# Patient Record
Sex: MALE | Race: ASIAN | NOT HISPANIC OR LATINO | Employment: FULL TIME | URBAN - METROPOLITAN AREA
[De-identification: names, ages, dates, MRNs, and addresses within clinical notes are randomized per-mention and may not be internally consistent; named-entity substitution may affect disease eponyms.]

---

## 2018-04-18 ENCOUNTER — OFFICE VISIT (OUTPATIENT)
Dept: URGENT CARE | Facility: CLINIC | Age: 28
End: 2018-04-18

## 2018-04-18 VITALS
DIASTOLIC BLOOD PRESSURE: 64 MMHG | HEART RATE: 66 BPM | OXYGEN SATURATION: 97 % | WEIGHT: 156 LBS | BODY MASS INDEX: 21.13 KG/M2 | SYSTOLIC BLOOD PRESSURE: 112 MMHG | RESPIRATION RATE: 14 BRPM | TEMPERATURE: 97.8 F | HEIGHT: 72 IN

## 2018-04-18 DIAGNOSIS — Z02.1 PHYSICAL EXAM, PRE-EMPLOYMENT: ICD-10-CM

## 2018-04-18 PROCEDURE — 8907 PR URINE COLLECT ONLY: Performed by: NURSE PRACTITIONER

## 2018-04-18 PROCEDURE — 8915 PR COMPREHENSIVE PHYSICAL: Performed by: NURSE PRACTITIONER

## 2018-04-18 ASSESSMENT — ENCOUNTER SYMPTOMS
CHILLS: 0
SPUTUM PRODUCTION: 0
DIAPHORESIS: 0
FALLS: 0
WHEEZING: 0
SHORTNESS OF BREATH: 0
BACK PAIN: 0
WEAKNESS: 0
HEADACHES: 0
FEVER: 0
NECK PAIN: 0
PALPITATIONS: 0
HEMOPTYSIS: 0
ORTHOPNEA: 0
MYALGIAS: 0
COUGH: 0
DIZZINESS: 0
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
## 2021-12-07 ENCOUNTER — TELEPHONE (OUTPATIENT)
Dept: SCHEDULING | Facility: IMAGING CENTER | Age: 31
End: 2021-12-07

## 2021-12-13 ENCOUNTER — TELEPHONE (OUTPATIENT)
Dept: SCHEDULING | Facility: IMAGING CENTER | Age: 31
End: 2021-12-13

## 2022-01-05 ENCOUNTER — OFFICE VISIT (OUTPATIENT)
Dept: MEDICAL GROUP | Facility: PHYSICIAN GROUP | Age: 32
End: 2022-01-05

## 2022-01-05 VITALS
SYSTOLIC BLOOD PRESSURE: 110 MMHG | RESPIRATION RATE: 14 BRPM | HEIGHT: 67 IN | WEIGHT: 158 LBS | DIASTOLIC BLOOD PRESSURE: 78 MMHG | HEART RATE: 90 BPM | OXYGEN SATURATION: 96 % | BODY MASS INDEX: 24.8 KG/M2 | TEMPERATURE: 99 F

## 2022-01-05 DIAGNOSIS — Z13.6 ENCOUNTER FOR LIPID SCREENING FOR CARDIOVASCULAR DISEASE: ICD-10-CM

## 2022-01-05 DIAGNOSIS — R10.13 EPIGASTRIC PAIN: ICD-10-CM

## 2022-01-05 DIAGNOSIS — Z13.220 ENCOUNTER FOR LIPID SCREENING FOR CARDIOVASCULAR DISEASE: ICD-10-CM

## 2022-01-05 DIAGNOSIS — K92.1 BLOODY STOOL: ICD-10-CM

## 2022-01-05 PROCEDURE — 99204 OFFICE O/P NEW MOD 45 MIN: CPT | Performed by: STUDENT IN AN ORGANIZED HEALTH CARE EDUCATION/TRAINING PROGRAM

## 2022-01-05 RX ORDER — FAMOTIDINE 20 MG/1
TABLET, FILM COATED ORAL
COMMUNITY
End: 2022-01-05

## 2022-01-05 RX ORDER — ONDANSETRON 4 MG/1
TABLET, FILM COATED ORAL
COMMUNITY
End: 2022-01-05

## 2022-01-05 ASSESSMENT — PATIENT HEALTH QUESTIONNAIRE - PHQ9
CLINICAL INTERPRETATION OF PHQ2 SCORE: 4
5. POOR APPETITE OR OVEREATING: 1 - SEVERAL DAYS
SUM OF ALL RESPONSES TO PHQ QUESTIONS 1-9: 11

## 2022-01-05 NOTE — PROGRESS NOTES
"New to Provider  (and [facility])      Tyree Kumar is a 31 y.o. male.    Reason for visit: New patient to establish          But self-pay, so may establish elsewhere.      - Prior PCP:  None.       (no routine care).   Chief Complaint   Patient presents with   • Establish Care   • Abdominal Pain             Problems discussed this visit:    This note uses problem-based documentation.  Subjective HPI is included in Assessment & Plan, at bottom of note.   Problem   Epigastric Pain                          Past Medical History:   Diagnosis Date   • Anxiety    • Liver disease     cyst on liver; to f/u with GI       History reviewed. No pertinent surgical history.       Family History   Problem Relation Age of Onset   • Anxiety disorder Mother    • Hypertension Mother    • Cancer Paternal Grandmother         prostate cancer   • Hyperlipidemia Paternal Grandmother        Social History     Tobacco Use   • Smoking status: Current Every Day Smoker     Packs/day: 0.25     Types: Cigarettes   • Smokeless tobacco: Never Used   • Tobacco comment: Smoker  (< 1/2 ppd x 10+ years)   Substance Use Topics   • Alcohol use: Not Currently     Comment: very rare       Current medications:  (including new changes):  No current outpatient medications on file.     No current facility-administered medications for this visit.       Allergies as of 01/05/2022 - Reviewed 01/05/2022   Allergen Reaction Noted   • Other food  11/15/2014                    Review of Symptoms  (as noted elsewhere, and .....)    GEN/CONST:   Denies fever, chills,    CARDIO:   Denies chest pain, or edema.    RESP:   Denies shortness of breath, or coughing.  GI:    + See HPI....      PSYCH:  Denies anxiety, depression,         Physical Exam  /78   Pulse 90   Temp 37.2 °C (99 °F) (Temporal)   Resp 14   Ht 1.702 m (5' 7\")   Wt 71.7 kg (158 lb)   SpO2 96%   BMI 24.75 kg/m²   General:  Alert and oriented, No apparent distress.  Neck: Supple. No " lymphadenopathy noted. Thyroid not enlarged.   Lungs: Clear to auscultation bilaterally.  No wheezes or rales.     Cardiovascular: Regular rate and rhythm.  No murmurs, or gallops.  Abdomen:  Soft.  Non-distended.       - Non-tender.        - Negative Ga's sign.   Extremities: No leg edema.  Good general motion of extremities.    Psychological: Appears to have normal mood and affect.      Labs:  No recent labs to review....    - new labs ordered.                             Assessment & Plan  (with subjective history and orders):  Of note, the list below is not in a specific nor sortable order.      Problem List Items Addressed This Visit     Epigastric pain     Chronic but intermittent episodes of abdominal pain.  Often in the middle of his abdomen, or epigastric region.  He notes this occasionally limits how much he eats, and has rarely vomited, from this.  He notes it is unpredictable in terms of duration.… Last year, it lasted for months at the time, without going away.  Recently, it has only occurred for a couple days at a time.  However, it is very unpredictable, and he has difficulty estimating how often this issue reoccurs.  He denies any current/routine use of alcohol or marijuana.  Not clearly timed with foods.…  He does note previously noting some blood on his stool, approximately 1 year ago (but not recently).    Concern for possible colitis or ulcer (versus IBS or other issue, given his history of anxiety).  -We will get basic labs, including H. pylori test.  -Also get fecal occult blood test, given his history.    The process of evaluation was discussed with the patient, and since he is currently unemployed and a self-pay patient, will try to check with getting discounted lab work (for self-pay patients).… We also discussed possibility of checking with [facility] or other providers in the area, who have sliding scales or reduced fees.…  He is unclear if he will follow-up here or  there at this time.  Patient was provided with lab work, in case he chooses to follow-up here.  However, it is understandable if he wishes to go somewhere that is much more affordable.         Relevant Orders    CBC WITH DIFFERENTIAL    Comp Metabolic Panel    H. PYLORI, UREA BREATH TEST, ADULT      Other Visit Diagnoses     Bloody stool        Relevant Orders    CBC WITH DIFFERENTIAL    OCCULT BLOOD FECES IMMUNOASSAY    Encounter for lipid screening for cardiovascular disease        Relevant Orders    Lipid Profile        Of note, the above list is not in a specific nor sortable order.                  Diagnosis Table, with orders:  1. Epigastric pain  CBC WITH DIFFERENTIAL    Comp Metabolic Panel    H. PYLORI, UREA BREATH TEST, ADULT   2. Bloody stool  CBC WITH DIFFERENTIAL    OCCULT BLOOD FECES IMMUNOASSAY   3. Encounter for lipid screening for cardiovascular disease  Lipid Profile                 Follow-up:  about 1 week after labs are done   (or with other clinic, if trying [facility]).                 A computerized dictation system may have been used in this note.    Despite review, there may be some errors in spelling or grammar.    Ciro Sevilla M.D.  1/5/2022

## 2022-01-05 NOTE — PATIENT INSTRUCTIONS
"Please have the lab work done, at some point, to check for basic underlying conditions, and to see if there is any evidence of an ulcer or blood in the stool.    Please be aware that insurance, the labs here may cost quite a bit.  Please check online to see if there is a discounted time or location through [facility], for cash patients, or you may wish to follow-up with one of the local [facility]\" (or [facility] providers, to see if the care there is more affordable (or possibly free).    Otherwise, you are welcome to follow-up here.  Thank you.  "

## 2022-01-05 NOTE — ASSESSMENT & PLAN NOTE
Chronic but intermittent episodes of abdominal pain.  Often in the middle of his abdomen, or epigastric region.  He notes this occasionally limits how much he eats, and has rarely vomited, from this.  He notes it is unpredictable in terms of duration.… Last year, it lasted for months at the time, without going away.  Recently, it has only occurred for a couple days at a time.  However, it is very unpredictable, and he has difficulty estimating how often this issue reoccurs.  He denies any current/routine use of alcohol or marijuana.  Not clearly timed with foods.…  He does note previously noting some blood on his stool, approximately 1 year ago (but not recently).    Concern for possible colitis or ulcer (versus IBS or other issue, given his history of anxiety).  -We will get basic labs, including H. pylori test.  -Also get fecal occult blood test, given his history.    The process of evaluation was discussed with the patient, and since he is currently unemployed and a self-pay patient, will try to check with getting discounted lab work (for self-pay patients).… We also discussed possibility of checking with [facility] or other providers in the area, who have sliding scales or reduced fees.…  He is unclear if he will follow-up here or there at this time.  Patient was provided with lab work, in case he chooses to follow-up here.  However, it is understandable if he wishes to go somewhere that is much more affordable.

## 2022-04-17 ENCOUNTER — OFFICE VISIT (OUTPATIENT)
Dept: URGENT CARE | Facility: PHYSICIAN GROUP | Age: 32
End: 2022-04-17
Payer: MEDICAID

## 2022-04-17 VITALS
RESPIRATION RATE: 20 BRPM | WEIGHT: 157 LBS | HEART RATE: 97 BPM | BODY MASS INDEX: 23.79 KG/M2 | TEMPERATURE: 98.8 F | OXYGEN SATURATION: 100 % | SYSTOLIC BLOOD PRESSURE: 128 MMHG | DIASTOLIC BLOOD PRESSURE: 68 MMHG | HEIGHT: 68 IN

## 2022-04-17 DIAGNOSIS — R42 LIGHTHEADEDNESS: ICD-10-CM

## 2022-04-17 DIAGNOSIS — R42 DIZZINESS: ICD-10-CM

## 2022-04-17 LAB
APPEARANCE UR: NORMAL
BILIRUB UR STRIP-MCNC: NORMAL MG/DL
COLOR UR AUTO: NORMAL
GLUCOSE BLD-MCNC: 96 MG/DL (ref 70–100)
GLUCOSE UR STRIP.AUTO-MCNC: NORMAL MG/DL
KETONES UR STRIP.AUTO-MCNC: NORMAL MG/DL
LEUKOCYTE ESTERASE UR QL STRIP.AUTO: NORMAL
NITRITE UR QL STRIP.AUTO: NORMAL
PH UR STRIP.AUTO: 7 [PH] (ref 5–8)
PROT UR QL STRIP: NORMAL MG/DL
RBC UR QL AUTO: NORMAL
SP GR UR STRIP.AUTO: 1.02
UROBILINOGEN UR STRIP-MCNC: 0.2 MG/DL

## 2022-04-17 PROCEDURE — 82962 GLUCOSE BLOOD TEST: CPT | Performed by: FAMILY MEDICINE

## 2022-04-17 PROCEDURE — 81002 URINALYSIS NONAUTO W/O SCOPE: CPT | Performed by: FAMILY MEDICINE

## 2022-04-17 PROCEDURE — 99214 OFFICE O/P EST MOD 30 MIN: CPT | Performed by: FAMILY MEDICINE

## 2022-04-17 NOTE — PROGRESS NOTES
Chief Complaint:    Chief Complaint   Patient presents with   • Dizziness     Feeling light headed. X4-5 days blurred vision, when smoking cigarettes. Pt is eating and drinking normally.         History of Present Illness:    Fluctuating feeling of lightheadedness, dizziness, and trouble focusing x 4-5 days. No other symptoms to point towards a source. No fever, nasal symptoms, sore throat, cough, nausea, vomiting, diarrhea, urine symptoms, or rash. Initially thought could be related to smoking, but there was a day where he had the symptoms without smoking.      Past Medical History:    Past Medical History:   Diagnosis Date   • Anxiety    • Liver disease     cyst on liver; to f/u with GI     Past Surgical History:    History reviewed. No pertinent surgical history.    Social History:    Social History     Socioeconomic History   • Marital status: Single     Spouse name: Not on file   • Number of children: Not on file   • Years of education: Not on file   • Highest education level: Not on file   Occupational History   • Not on file   Tobacco Use   • Smoking status: Current Every Day Smoker     Packs/day: 0.25     Types: Cigarettes   • Smokeless tobacco: Never Used   • Tobacco comment: Smoker  (< 1/2 ppd x 10+ years)   Vaping Use   • Vaping Use: Never used   Substance and Sexual Activity   • Alcohol use: Not Currently     Comment: very rare   • Drug use: Not Currently   • Sexual activity: Yes     Partners: Female     Comment: Single (long-term partner)   Other Topics Concern   • Not on file   Social History Narrative   • Not on file     Social Determinants of Health     Financial Resource Strain: Not on file   Food Insecurity: Not on file   Transportation Needs: Not on file   Physical Activity: Not on file   Stress: Not on file   Social Connections: Not on file   Intimate Partner Violence: Not on file   Housing Stability: Not on file     Family History:    Family History   Problem Relation Age of Onset   • Anxiety  "disorder Mother    • Hypertension Mother    • Cancer Paternal Grandmother         prostate cancer   • Hyperlipidemia Paternal Grandmother      Medications:    No current outpatient medications on file prior to visit.     No current facility-administered medications on file prior to visit.     Allergies:    Allergies   Allergen Reactions   • Other Food      Melons, avocado (if in large amount)       Vitals:    Vitals:    04/17/22 1436   BP: 128/68   Pulse: 97   Resp: 20   Temp: 37.1 °C (98.8 °F)   TempSrc: Temporal   SpO2: 100%   Weight: 71.2 kg (157 lb)   Height: 1.727 m (5' 8\")       Physical Exam:    Constitutional: Vital signs reviewed. Appears well-developed and well-nourished. No acute distress.   Eyes: Sclera white, conjunctivae clear. PERRLA.  ENT: External ears normal. External auditory canals normal without discharge. TMs translucent and non-bulging. Hearing normal. Lips/teeth are normal. Oral mucosa pink and moist. Posterior pharynx: WNL.  Neck: Neck supple.   Cardiovascular: Regular rate and rhythm. No murmur.  Pulmonary/Chest: Respirations non-labored. Clear to auscultation bilaterally.  Abdomen: Bowel sounds are normal active. Soft, non-distended, and non-tender to palpation.  Musculoskeletal: Normal gait. Normal range of motion. No muscular atrophy or weakness.  Neurological: Alert and oriented to person, place, and time. CN 2-12 intact. Muscle tone normal. Coordination normal. Normal cerebellar exam. Negative Jeremy-Hallpike maneuver bilaterally.  Skin: No rashes or lesions. Warm, dry, normal turgor.  Psychiatric: Normal mood and affect. Behavior is normal. Judgment and thought content normal.       Diagnostics:    POCT Urinalysis  Order: [id number]   Status: Final result     Visible to patient: Catia (scheduled for 4/18/2022  1:05 PM)     Next appt: None     Dx: Dizziness; Lightheadedness     0 Result Notes     Ref Range & Units  2:54 PM   POC Color Negative CYNTHIA    POC Appearance Negative CLOUDY    POC " Leukocyte Esterase Negative NEG    POC Nitrites Negative NEG    POC Urobiligen Negative (0.2) mg/dL 0.2    POC Protein Negative mg/dL NEG    POC Urine PH 5.0 - 8.0 7.0    POC Blood Negative NEG    POC Specific Gravity <1.005 - >1.030 1.020    POC Ketones Negative mg/dL NEG    POC Bilirubin Negative mg/dL NEG    POC Glucose Negative mg/dL NEG    Resulting Agency  [facility]              Specimen Collected: 22  2:54 PM Last Resulted: 22  2:54 PM              POCT glucose  Order: [id number]   Status: Final result     Visible to patient: No (scheduled for 2022  1:05 PM)     Next appt: None     Dx: Dizziness; Lightheadedness     0 Result Notes     Ref Range & Units  3:05 PM   Glucose - Accu-Ck 70 - 100 mg/dL 96    Resulting Agency  [facility]              Specimen Collected: 22  3:05 PM Last Resulted: 22  3:05 PM             Medical Decision Makin. Lightheadedness  - POCT Urinalysis  - POCT glucose    2. Dizziness  - POCT Urinalysis  - POCT glucose      Discussed with him DDX, management options, and risks, benefits, and alternatives to treatment plan agreed upon.    Fluctuating feeling of lightheadedness, dizziness, and trouble focusing x 4-5 days. No other symptoms to point towards a source. No fever, nasal symptoms, sore throat, cough, nausea, vomiting, diarrhea, urine symptoms, or rash. Initially thought could be related to smoking, but there was a day where he had the symptoms without smoking.    Urine dipstick is normal.    Fingerstick blood sugar: 96 mg/dl (ate about 5 hrs ago).    ? etiology of symptoms. Vitals are stable. Exam is benign and non-focal.     Offered to check EKG. He declines (self-pay).    We only have x-ray imaging here which will not be helpful for evaluation of his symptoms.    We only have POC labs here today. Advised other general screening labs could be done tomorrow when lab is here to rule out conditions, understanding nothing may show up on labs to  explain symptoms.    He would like to further observe at this time and seek medical evaluation should anything worsen or any other concerns.

## 2022-08-07 ENCOUNTER — OFFICE VISIT (OUTPATIENT)
Dept: URGENT CARE | Facility: CLINIC | Age: 32
End: 2022-08-07
Payer: MEDICAID

## 2022-08-07 VITALS
DIASTOLIC BLOOD PRESSURE: 84 MMHG | TEMPERATURE: 99.6 F | OXYGEN SATURATION: 98 % | RESPIRATION RATE: 16 BRPM | HEART RATE: 86 BPM | SYSTOLIC BLOOD PRESSURE: 120 MMHG | HEIGHT: 67 IN | BODY MASS INDEX: 24.64 KG/M2 | WEIGHT: 157 LBS

## 2022-08-07 DIAGNOSIS — K92.1 BLOODY STOOLS: ICD-10-CM

## 2022-08-07 PROCEDURE — 99214 OFFICE O/P EST MOD 30 MIN: CPT | Performed by: NURSE PRACTITIONER

## 2022-08-07 ASSESSMENT — ENCOUNTER SYMPTOMS
CHILLS: 0
BLOOD IN STOOL: 1
MYALGIAS: 0
DIARRHEA: 0
ABDOMINAL PAIN: 1
FEVER: 0
CONSTIPATION: 0
VOMITING: 0

## 2022-08-08 NOTE — PROGRESS NOTES
Subjective     Tyree Kumar is a 31 y.o. male who presents with Bloody Stools (X1 week, stomach pain, slight mucus like in the beginning/)            HPI New. 31 year old male with stomach pain and blood in stool for one week. He reports stool with bright red blood over stool and when he wipes. Denies constipation or diarrhea. He denies fever, chills, dizziness or headache. He states when he has pain it is in upper abdomen and stabbing. This is intermittent. Has been seen by PCP for this in Jan but never followed through on labs. Hx of colon cancer in grandfather. He has not taken any medication for this issue.  Other food  No current outpatient medications on file prior to visit.     No current facility-administered medications on file prior to visit.     Social History     Socioeconomic History   • Marital status: Single     Spouse name: Not on file   • Number of children: Not on file   • Years of education: Not on file   • Highest education level: Not on file   Occupational History   • Not on file   Tobacco Use   • Smoking status: Current Some Day Smoker     Packs/day: 0.25     Types: Cigarettes   • Smokeless tobacco: Never Used   • Tobacco comment: Smoker  (< 1/2 ppd x 10+ years)   Vaping Use   • Vaping Use: Never used   Substance and Sexual Activity   • Alcohol use: Not Currently     Comment: very rare   • Drug use: Not Currently   • Sexual activity: Yes     Partners: Female     Comment: Single (long-term partner)   Other Topics Concern   • Not on file   Social History Narrative   • Not on file     Social Determinants of Health     Financial Resource Strain: Not on file   Food Insecurity: Not on file   Transportation Needs: Not on file   Physical Activity: Not on file   Stress: Not on file   Social Connections: Not on file   Intimate Partner Violence: Not on file   Housing Stability: Not on file     Breast Cancer-related family history is not on file.      Review of Systems   Constitutional: Negative for chills  "and fever.   Gastrointestinal: Positive for abdominal pain and blood in stool. Negative for constipation, diarrhea and vomiting.   Musculoskeletal: Negative for myalgias.              Objective     /84   Pulse 86   Temp 37.6 °C (99.6 °F) (Temporal)   Resp 16   Ht 1.702 m (5' 7\")   Wt 71.2 kg (157 lb)   SpO2 98%   BMI 24.59 kg/m²      Physical Exam  Nursing note reviewed.   Cardiovascular:      Rate and Rhythm: Normal rate and regular rhythm.      Heart sounds: No murmur heard.  Pulmonary:      Effort: Pulmonary effort is normal.      Breath sounds: Normal breath sounds.   Abdominal:      General: Abdomen is flat.      Palpations: Abdomen is soft. There is no mass.      Tenderness: There is no abdominal tenderness.   Skin:     General: Skin is warm and dry.   Neurological:      General: No focal deficit present.      Mental Status: He is alert and oriented to person, place, and time.   Psychiatric:         Mood and Affect: Mood normal.                             Assessment & Plan        1. Bloody stools  Referral to Gastroenterology    CBC WITH DIFFERENTIAL    OCCULT BLOOD FECES IMMUNOASSAY     Referral to GI as he has family hx of colon cancer.  Labs- will call with results.  VSS on this visit. ED precautions given.              "

## 2023-04-24 ENCOUNTER — OFFICE VISIT (OUTPATIENT)
Dept: URGENT CARE | Facility: PHYSICIAN GROUP | Age: 33
End: 2023-04-24
Payer: MEDICAID

## 2023-04-24 ENCOUNTER — APPOINTMENT (OUTPATIENT)
Dept: RADIOLOGY | Facility: IMAGING CENTER | Age: 33
End: 2023-04-24
Attending: PHYSICIAN ASSISTANT
Payer: MEDICAID

## 2023-04-24 VITALS
HEART RATE: 60 BPM | TEMPERATURE: 97.1 F | DIASTOLIC BLOOD PRESSURE: 62 MMHG | SYSTOLIC BLOOD PRESSURE: 120 MMHG | RESPIRATION RATE: 14 BRPM | BODY MASS INDEX: 26.37 KG/M2 | OXYGEN SATURATION: 100 % | HEIGHT: 67 IN | WEIGHT: 168 LBS

## 2023-04-24 DIAGNOSIS — R06.02 SHORTNESS OF BREATH: ICD-10-CM

## 2023-04-24 PROCEDURE — 71046 X-RAY EXAM CHEST 2 VIEWS: CPT | Mod: TC,FY | Performed by: PHYSICIAN ASSISTANT

## 2023-04-24 PROCEDURE — 99213 OFFICE O/P EST LOW 20 MIN: CPT | Performed by: PHYSICIAN ASSISTANT

## 2023-04-24 ASSESSMENT — ENCOUNTER SYMPTOMS
HEADACHES: 0
WHEEZING: 0
NAUSEA: 0
FEVER: 0
EYE DISCHARGE: 0
EYE REDNESS: 0
DIZZINESS: 1
DIARRHEA: 0
MYALGIAS: 0
VOMITING: 0
COUGH: 0
SHORTNESS OF BREATH: 1

## 2023-04-24 NOTE — PROGRESS NOTES
Boubacar Kumar is a 32 y.o. male who presents with Dizziness (X 1 day ) and Shortness of Breath (X 3 days /Dry metallic taste in mouth. )            This is a new problem.  The patient presents to clinic complaining of intermittent shortness of breath x3 days.  The patient states his shortness of breath is most noticeable upon exertion.    Shortness of Breath  This is a new problem. Episode onset: x 3 days ago. The problem occurs intermittently. The problem has been unchanged. Pertinent negatives include no chest pain, fever, headaches, leg pain, leg swelling, sputum production, vomiting or wheezing. The symptoms are aggravated by exercise (and exertion. The patient states he was helping a friend change the tires on his car earlier today and became winded after changing 2 of the tires.). The patient has no known risk factors for DVT/PE. He has tried nothing for the symptoms.     The patient states earlier today he developed some lightheadedness, which is since improved/resolved.  The patient states the lightheadedness occurred upon arriving to clinic and was not associated with any exertion.    The patient reports no recent sick contacts.  He reports no known exposure to COVID-19 and/or influenza.    The patient is a former smoker.    The patient reports no prior history of PE/DVT.    The patient admits to increased stress, as well as anxiety related to his current symptoms.  The patient states his father recently passed away.  The patient states his father had a history of lung cancer.  The patient states he also recently lost his sister.    PMH:  has a past medical history of Anxiety and Liver disease.  MEDS: No current outpatient medications on file.  ALLERGIES:   Allergies   Allergen Reactions    Other Food      Melons, avocado (if in large amount)     SURGHX: No past surgical history on file.  SOCHX:  reports that he has been smoking cigarettes. He has been smoking an average of .25 packs per day.  "He has never used smokeless tobacco. He reports that he does not currently use alcohol. He reports that he does not currently use drugs.  FH: Family history was reviewed, no pertinent findings to report    Review of Systems   Constitutional:  Negative for fever.   HENT:  Positive for congestion (The patient reports ongoing congestion, which she attributes to seasonal allergies.).    Eyes:  Negative for discharge and redness.   Respiratory:  Positive for shortness of breath. Negative for cough, sputum production and wheezing.    Cardiovascular:  Negative for chest pain and leg swelling.   Gastrointestinal:  Negative for diarrhea, nausea and vomiting.   Musculoskeletal:  Negative for myalgias.   Neurological:  Positive for dizziness. Negative for headaches.            Objective     /62   Pulse 60   Temp 36.2 °C (97.1 °F) (Temporal)   Resp 14   Ht 1.702 m (5' 7\")   Wt 76.2 kg (168 lb)   SpO2 100%   BMI 26.31 kg/m²      Physical Exam  Constitutional:       General: He is not in acute distress.     Appearance: Normal appearance. He is not ill-appearing.   HENT:      Head: Normocephalic and atraumatic.      Right Ear: External ear normal.      Left Ear: External ear normal.      Nose: Nose normal.      Mouth/Throat:      Mouth: Mucous membranes are moist.      Pharynx: Oropharynx is clear. No posterior oropharyngeal erythema.   Eyes:      Extraocular Movements: Extraocular movements intact.      Conjunctiva/sclera: Conjunctivae normal.   Cardiovascular:      Rate and Rhythm: Normal rate and regular rhythm.      Heart sounds: Normal heart sounds.   Pulmonary:      Effort: Pulmonary effort is normal. No respiratory distress.      Breath sounds: Normal breath sounds. No wheezing.   Musculoskeletal:         General: Normal range of motion.      Cervical back: Normal range of motion and neck supple.      Right lower leg: No edema.      Left lower leg: No edema.   Skin:     General: Skin is warm and dry. "   Neurological:      Mental Status: He is alert and oriented to person, place, and time.             Progress:  CXR:  COMPARISON:  None available.     FINDINGS:  The lungs are clear. No consolidation, edema, effusion, or pneumothorax is noted. Cardiac silhouette is within normal limits in size and unremarkable. Osseous structures and soft tissues are unremarkable.     IMPRESSION:  No acute cardiopulmonary process.             Assessment & Plan         1. Shortness of breath  - DX-CHEST-2 VIEWS; Future  - Referral to establish with [facility] PCP    The patient's presenting symptoms and physical exam findings are consistent with shortness of breath.  The patient has been experiencing intermittent shortness of breath with exertion over the past 3 days.  The patient's physical exam today in clinic was normal.  The patient's lungs were clear to auscultation without wheezing or rhonchi, and his pulse ox was within normal limits.  The patient is nontoxic and appears in no acute distress.  The patient's vital signs are stable and within normal limits.  He is afebrile today in clinic.  A chest x-ray was obtained to further evaluate the patient's current symptoms.  The patient's chest x-ray showed no acute cardiopulmonary process.  The cause of the patient's intermittent shortness of breath is unknown at this time.  The patient has no risk factors for an acute PE/DVT.  The patient's PERC score is 0.  The patient admits a history of anxiety with prior panic attacks.  The patient's current shortness of breath could be related to increased stress and anxiety due to the loss of his family members over the recent months.  The patient's shortness of breath could also be related to his known seasonal allergies.  Advised the patient to monitor for worsening signs and or symptoms.  Recommend OTC medications and supportive care for symptomatic management.  We will place a referral to primary care for further evaluation and management.   Discussed strict ED precautions with the patient, and he verbalized understanding.       Differential diagnoses, supportive care, and indications for immediate follow-up discussed with patient.   Instructed to return to clinic or nearest emergency department for any change in condition, further concerns, or worsening of symptoms.    OTC Tylenol or Motrin for fever/discomfort.  OTC antihistamines for symptomatic relief  Monitor for worsening signs or symptoms  Referral to Primary Care  Return to clinic or go to the ED if symptoms worsen or fail to improve, or if patient develop worsening/increasing/persistent shortness of breath, wheezing, chest pain, leg swelling, hemoptysis, cough, congestion, ear pain, sore throat, dizziness, lightheadedness, fever/chills, and/or any concerning symptoms.       Discussed plan with the patient, and he agrees to the above.    I personally reviewed prior external notes and test results pertinent to today's visit.  I have independently reviewed and interpreted all diagnostics ordered during this urgent care visit.     Please note that this dictation was created using voice recognition software. I have made every reasonable attempt to correct obvious errors, but I expect that there may be errors of grammar and possibly content that I did not discover before finalizing the note.     This note was electronically signed by Angelita Mace PA-C

## 2023-04-25 ASSESSMENT — ENCOUNTER SYMPTOMS
SPUTUM PRODUCTION: 0
LEG PAIN: 0

## 2023-05-03 ENCOUNTER — TELEPHONE (OUTPATIENT)
Dept: HEALTH INFORMATION MANAGEMENT | Facility: OTHER | Age: 33
End: 2023-05-03
Payer: MEDICAID

## 2024-03-01 ENCOUNTER — APPOINTMENT (OUTPATIENT)
Dept: RADIOLOGY | Facility: MEDICAL CENTER | Age: 34
End: 2024-03-01
Attending: EMERGENCY MEDICINE
Payer: COMMERCIAL

## 2024-03-01 ENCOUNTER — HOSPITAL ENCOUNTER (EMERGENCY)
Facility: MEDICAL CENTER | Age: 34
End: 2024-03-01
Attending: EMERGENCY MEDICINE
Payer: COMMERCIAL

## 2024-03-01 VITALS
DIASTOLIC BLOOD PRESSURE: 51 MMHG | HEART RATE: 78 BPM | HEIGHT: 67 IN | WEIGHT: 175.49 LBS | OXYGEN SATURATION: 94 % | BODY MASS INDEX: 27.54 KG/M2 | SYSTOLIC BLOOD PRESSURE: 110 MMHG | TEMPERATURE: 97 F | RESPIRATION RATE: 16 BRPM

## 2024-03-01 DIAGNOSIS — K76.9 LIVER LESION: ICD-10-CM

## 2024-03-01 DIAGNOSIS — R10.9 ACUTE ABDOMINAL PAIN: ICD-10-CM

## 2024-03-01 DIAGNOSIS — R07.9 ACUTE CHEST PAIN: ICD-10-CM

## 2024-03-01 LAB
ALBUMIN SERPL BCP-MCNC: 4.5 G/DL (ref 3.2–4.9)
ALBUMIN/GLOB SERPL: 1.5 G/DL
ALP SERPL-CCNC: 111 U/L (ref 30–99)
ALT SERPL-CCNC: 22 U/L (ref 2–50)
ANION GAP SERPL CALC-SCNC: 13 MMOL/L (ref 7–16)
APPEARANCE UR: CLEAR
AST SERPL-CCNC: 24 U/L (ref 12–45)
BASOPHILS # BLD AUTO: 0.3 % (ref 0–1.8)
BASOPHILS # BLD: 0.03 K/UL (ref 0–0.12)
BILIRUB SERPL-MCNC: 0.3 MG/DL (ref 0.1–1.5)
BILIRUB UR QL STRIP.AUTO: NEGATIVE
BUN SERPL-MCNC: 13 MG/DL (ref 8–22)
CALCIUM ALBUM COR SERPL-MCNC: 8.9 MG/DL (ref 8.5–10.5)
CALCIUM SERPL-MCNC: 9.3 MG/DL (ref 8.5–10.5)
CHLORIDE SERPL-SCNC: 101 MMOL/L (ref 96–112)
CO2 SERPL-SCNC: 24 MMOL/L (ref 20–33)
COLOR UR: YELLOW
CREAT SERPL-MCNC: 0.91 MG/DL (ref 0.5–1.4)
EKG IMPRESSION: NORMAL
EOSINOPHIL # BLD AUTO: 0.22 K/UL (ref 0–0.51)
EOSINOPHIL NFR BLD: 2.2 % (ref 0–6.9)
ERYTHROCYTE [DISTWIDTH] IN BLOOD BY AUTOMATED COUNT: 36.2 FL (ref 35.9–50)
GFR SERPLBLD CREATININE-BSD FMLA CKD-EPI: 114 ML/MIN/1.73 M 2
GLOBULIN SER CALC-MCNC: 3.1 G/DL (ref 1.9–3.5)
GLUCOSE SERPL-MCNC: 88 MG/DL (ref 65–99)
GLUCOSE UR STRIP.AUTO-MCNC: NEGATIVE MG/DL
HCT VFR BLD AUTO: 42.7 % (ref 42–52)
HGB BLD-MCNC: 14.2 G/DL (ref 14–18)
IMM GRANULOCYTES # BLD AUTO: 0.04 K/UL (ref 0–0.11)
IMM GRANULOCYTES NFR BLD AUTO: 0.4 % (ref 0–0.9)
KETONES UR STRIP.AUTO-MCNC: NEGATIVE MG/DL
LEUKOCYTE ESTERASE UR QL STRIP.AUTO: NEGATIVE
LIPASE SERPL-CCNC: 30 U/L (ref 11–82)
LYMPHOCYTES # BLD AUTO: 1.82 K/UL (ref 1–4.8)
LYMPHOCYTES NFR BLD: 18.2 % (ref 22–41)
MCH RBC QN AUTO: 24.6 PG (ref 27–33)
MCHC RBC AUTO-ENTMCNC: 33.3 G/DL (ref 32.3–36.5)
MCV RBC AUTO: 73.9 FL (ref 81.4–97.8)
MICRO URNS: NORMAL
MONOCYTES # BLD AUTO: 0.64 K/UL (ref 0–0.85)
MONOCYTES NFR BLD AUTO: 6.4 % (ref 0–13.4)
NEUTROPHILS # BLD AUTO: 7.26 K/UL (ref 1.82–7.42)
NEUTROPHILS NFR BLD: 72.5 % (ref 44–72)
NITRITE UR QL STRIP.AUTO: NEGATIVE
NRBC # BLD AUTO: 0 K/UL
NRBC BLD-RTO: 0 /100 WBC (ref 0–0.2)
PH UR STRIP.AUTO: 7 [PH] (ref 5–8)
PLATELET # BLD AUTO: 361 K/UL (ref 164–446)
PMV BLD AUTO: 9.1 FL (ref 9–12.9)
POTASSIUM SERPL-SCNC: 3.7 MMOL/L (ref 3.6–5.5)
PROT SERPL-MCNC: 7.6 G/DL (ref 6–8.2)
PROT UR QL STRIP: NEGATIVE MG/DL
RBC # BLD AUTO: 5.78 M/UL (ref 4.7–6.1)
RBC UR QL AUTO: NEGATIVE
SODIUM SERPL-SCNC: 138 MMOL/L (ref 135–145)
SP GR UR STRIP.AUTO: 1.01
TROPONIN T SERPL-MCNC: <6 NG/L (ref 6–19)
UROBILINOGEN UR STRIP.AUTO-MCNC: 0.2 MG/DL
WBC # BLD AUTO: 10 K/UL (ref 4.8–10.8)

## 2024-03-01 PROCEDURE — 74177 CT ABD & PELVIS W/CONTRAST: CPT

## 2024-03-01 PROCEDURE — 93005 ELECTROCARDIOGRAM TRACING: CPT

## 2024-03-01 PROCEDURE — 84484 ASSAY OF TROPONIN QUANT: CPT

## 2024-03-01 PROCEDURE — 93005 ELECTROCARDIOGRAM TRACING: CPT | Performed by: EMERGENCY MEDICINE

## 2024-03-01 PROCEDURE — 71045 X-RAY EXAM CHEST 1 VIEW: CPT

## 2024-03-01 PROCEDURE — 85025 COMPLETE CBC W/AUTO DIFF WBC: CPT

## 2024-03-01 PROCEDURE — 700117 HCHG RX CONTRAST REV CODE 255: Mod: UD | Performed by: EMERGENCY MEDICINE

## 2024-03-01 PROCEDURE — 36415 COLL VENOUS BLD VENIPUNCTURE: CPT

## 2024-03-01 PROCEDURE — 83690 ASSAY OF LIPASE: CPT

## 2024-03-01 PROCEDURE — 99284 EMERGENCY DEPT VISIT MOD MDM: CPT

## 2024-03-01 PROCEDURE — 81003 URINALYSIS AUTO W/O SCOPE: CPT

## 2024-03-01 PROCEDURE — 80053 COMPREHEN METABOLIC PANEL: CPT

## 2024-03-01 RX ADMIN — IOHEXOL 100 ML: 350 INJECTION, SOLUTION INTRAVENOUS at 18:15

## 2024-03-01 ASSESSMENT — LIFESTYLE VARIABLES: DO YOU DRINK ALCOHOL: NO

## 2024-03-01 NOTE — ED PROVIDER NOTES
ER Provider Note    Scribed for Eduardo Gauthier M.d. by Dave Troncoso. 3/1/2024  3:34 PM    Primary Care Provider: Pcp Pt States None    CHIEF COMPLAINT  Chief Complaint   Patient presents with    Chest Pain     Intermittent x 1 week. Substernal and left sided CP. -cardiac hx    Abdominal Pain     Constipation and bloating, white mucus surrounding stool. Hx anal fissure    Bloody Stools     X 1 week.      EXTERNAL RECORDS REVIEWED  Other No recent records to review. Patient last presented to Urgent Care in April of 2023 for shortness of breath.     HPI/ROS  LIMITATION TO HISTORY   Select: : None    OUTSIDE HISTORIAN(S):  None    Tyree Kumar is a 33 y.o. male who presents to the ED for evaluation of abdominal pain, onset 2.5 weeks ago. The patient states the pain started in his abdomen, but is now radiating into his chest. He has been experiencing intermittent abdominal pain and substernal left sided chest pain for the past week. These episodes of pain usually occur at night just before the patient goes to bed or in the middle of the night. He notes the duration of the episodes vary, lasting from a few minutes to a couple hours.The patient also reports associated symptoms of constipation, and notes when he has a bowel movement there is mucous and blood in his stool. He has a history of a similar episode of pain approximately one year ago. He notes he informed his primary provider of his symptoms today who referred him to present to the ED for further evaluation. The patient also reports a history of anal fissure and has previously presented to GI. He denies any known history of Ulcerative Colitis or Crohn's disease. He has a family history of colon cancer.     PAST MEDICAL HISTORY  Past Medical History:   Diagnosis Date    Anxiety     Liver disease     cyst on liver; to f/u with GI       SURGICAL HISTORY  History reviewed. No pertinent surgical history.    FAMILY HISTORY  Family History   Problem Relation Age  "of Onset    Anxiety disorder Mother     Hypertension Mother     Cancer Paternal Grandmother         prostate cancer    Hyperlipidemia Paternal Grandmother        SOCIAL HISTORY   reports that he has quit smoking. His smoking use included cigarettes. He has never used smokeless tobacco. He reports that he does not currently use alcohol. He reports that he does not currently use drugs.    CURRENT MEDICATIONS  No current outpatient medications     ALLERGIES  Other food    PHYSICAL EXAM  /76   Pulse 69   Temp 35.9 °C (96.6 °F) (Temporal)   Resp 18   Ht 1.702 m (5' 7\")   Wt 79.6 kg (175 lb 7.8 oz)   SpO2 95%   BMI 27.49 kg/m²   Constitutional: Alert in no apparent distress.  HENT: No signs of trauma, Bilateral external ears normal, Nose normal. Uvula midline.   Eyes: Pupils are equal and reactive, Conjunctiva normal, Non-icteric.   Neck: Normal range of motion, No tenderness, Supple, No stridor.   Lymphatic: No lymphadenopathy noted.   Cardiovascular: Regular rate and rhythm, no murmurs.   Thorax & Lungs: Normal breath sounds, No respiratory distress, No wheezing, No chest tenderness.   Abdomen: Bowel sounds normal, Soft, Diffuse mild tenderness, No peritoneal signs, No masses, No pulsatile masses.   Skin: Warm, Dry, No erythema, No rash.   Back: No bony tenderness, No CVA tenderness.   Extremities: Intact distal pulses, No edema, No tenderness, No cyanosis.  Musculoskeletal: Good range of motion in all major joints. No tenderness to palpation or major deformities noted.   Neurologic: Alert , Normal motor function, Normal sensory function, No focal deficits noted.   Psychiatric: Affect normal, Judgment normal, Mood normal.      DIAGNOSTIC STUDIES    Labs:   Labs Reviewed   CBC WITH DIFFERENTIAL - Abnormal; Notable for the following components:       Result Value    MCV 73.9 (*)     MCH 24.6 (*)     Neutrophils-Polys 72.50 (*)     Lymphocytes 18.20 (*)     All other components within normal limits   COMP " METABOLIC PANEL - Abnormal; Notable for the following components:    Alkaline Phosphatase 111 (*)     All other components within normal limits   LIPASE   URINALYSIS   TROPONIN   ESTIMATED GFR        EKG:   I have independently interpreted this EKG   Report   Date Value Ref Range Status   2024       [facility] Emergency Dept[facility]    Test Date:  2024  Pt Name:    CLAUDIA TROTTER                 Department: ER  MRN:        [id number]                      Room:  Gender:     Male                         Technician: [id number]  :        1990                   Requested By:ER TRIAGE PROTOCOL  Order #:    [id number]                    Reading MD:    Measurements  Intervals                                Axis  Rate:       69                           P:          49  RI:         129                          QRS:        79  QRSD:       93                           T:          41  QT:         386  QTc:        414    Interpretive Statements  Sinus rhythm  ST elev, probable normal early repol pattern  No previous ECG available for comparison                 Radiology:   The attending emergency physician has independently interpreted the diagnostic imaging associated with this visit and am waiting the final reading from the radiologist.   Preliminary interpretation is a follows: no free air  Radiologist interpretation:   CT-ABDOMEN-PELVIS WITH   Final Result      1.  3.7 cm indeterminate liver lesion for which further workup is recommended with ultrasound or MRI.      DX-CHEST-PORTABLE (1 VIEW)   Final Result      No acute cardiac or pulmonary abnormalities are identified.           COURSE & MEDICAL DECISION MAKING     ED Observation Status? No; Patient does not meet criteria for ED Observation.     INITIAL ASSESSMENT, COURSE AND PLAN  Care Narrative: 33 y.o. M p/w CC of abdominal pain onset 2.5 weeks ago.     3:35 PM - Patient seen and examined at bedside. Discussed plan of care, including CT imaging and  there diagnostic studies. Patient agrees to the plan of care. The patient declines medication for pain at this time. Ordered for EKG, CT-Abdomen pelvis with, DX-Chest, CBC with differential, CMP, Lipase, UA, and Troponin to evaluate his symptoms.      Differential diagnoses include but not limited to:   #abdominal pain    CT scan of abdomen ordered    No RLQ pain, TTP or fever to suggest appendicitis  No LLQ pain or TTP or fever to suggest diverticulitis  No pain at of proportion to suggest mesenteric ischemia  No e/o rash or zoster    No e/o UTI  No elevation in lipase to suggest pancreatitis  ECG unremarkable and no chest pain to suggest intrathoracic etiology of abd pain       #acute chest pain    No unilateral leg swelling  No hemoptysis  No personal or family hx of DVT or PE  No recent surgeries  No persistent shortness of breath    CBC negative for significant anemia/leukocytosis.  CMP negative for significant electrolyte abnormality.  Troponin/EKG (interpreted by me) without acute ischemia    HEART SCORE: 1    Given ongoing pain for one week and no STEMI on ECG and negative troponin, doubt additional benefit in repeat troponin or ECG.     Given symmetric pulses and no radiation to back and no hx of HTN doubt aortic dissection    Hx and physical exam not c/w esophageal rupture or spasm    Can not exclude  gastroesophageal reflux disease, musculoskeletal chest pain, or peptic ulcer disease as possible dx however feel that pt is able to follow up with PCP for further work up.     Hx and CXR (interpreted by me) not suggestive of PNA, PTX.  low risk by Wells Criteria, doubt PE.     6:13 PM - Patient reevaluated at bedside. I discussed the patient's diagnostic study results which showed a liver lesion but are otherwise are reassuring. Discussed plan for discharge; I advised the patient to follow-up with his PCP and GI, and to return to the [facility] ED with any new or worsening symptoms. Patient was given the  opportunity for questions. I addressed all questions or concerns and the patient is stable for discharge at this time. Patient verbalizes understanding and support with my plan for discharge.         DISPOSITION AND DISCUSSIONS    Escalation of care considered, and ultimately not performed: acute inpatient care management, however at this time, the patient is most appropriate for outpatient management.    Barriers to care at this time, including but not limited to:  None known at this time .     Decision tools and prescription drugs considered including, but not limited to: HEART Score 1 .     The patient will return for new or worsening symptoms and is stable at the time of discharge.      DISPOSITION:  Patient will be discharged home in stable condition.    FOLLOW UP:  SHAREE Quigley  [address] [facility] [address] [id number] [phone]    In 3 days      [facility]  [facility] [facility] [address]  [phone]  In 1 week      [facility], Emergency Dept  [address] [address] [phone]    If symptoms worsen      FINAL DIAGNOSIS  1. Acute abdominal pain    2. Liver lesion    3. Acute chest pain        Dave AGUIRRE (Alysha), am scribing for, and in the presence of, Eduardo Gauthier M.D..    Electronically signed by: Dave Troncoso (Alysha), 3/1/2024    Eduardo AGUIRRE M.D. personally performed the services described in this documentation, as scribed by Dave Troncoso in my presence, and it is both accurate and complete.      The note accurately reflects work and decisions made by me.  Eduardo Gauthier M.D.  3/1/2024  8:07 PM

## 2024-03-01 NOTE — ED TRIAGE NOTES
Tyree Kumar  33 y.o. male  Chief Complaint   Patient presents with    Chest Pain     Intermittent x 1 week. Substernal and left sided CP. -cardiac hx    Abdominal Pain     Constipation and bloating, white mucus surrounding stool. Hx anal fissure    Bloody Stools     X 1 week.        Pt ambulatory to triage with steady gait for above complaint. Denies CP now. EKG performed.     Pt is GCS 15, speaking in full sentences, follows commands and responds appropriately to questions. Resp are even and unlabored.     Abdominal pain protocol ordered. Pt placed in draw room hallway. Pt educated on triage process. Pt encouraged to alert staff for any changes.       Vitals:    03/01/24 1428   BP: 120/76   Pulse: 69   Resp: 18   Temp: 35.9 °C (96.6 °F)   SpO2: 95%

## 2024-03-02 NOTE — DISCHARGE INSTRUCTIONS
Please discuss the following findings with your regular doctor:  CT-ABDOMEN-PELVIS WITH   Final Result      1.  3.7 cm indeterminate liver lesion for which further workup is recommended with ultrasound or MRI.      DX-CHEST-PORTABLE (1 VIEW)   Final Result      No acute cardiac or pulmonary abnormalities are identified.        Labs Reviewed   CBC WITH DIFFERENTIAL - Abnormal; Notable for the following components:       Result Value    MCV 73.9 (*)     MCH 24.6 (*)     Neutrophils-Polys 72.50 (*)     Lymphocytes 18.20 (*)     All other components within normal limits   COMP METABOLIC PANEL - Abnormal; Notable for the following components:    Alkaline Phosphatase 111 (*)     All other components within normal limits   LIPASE   URINALYSIS   TROPONIN   ESTIMATED GFR

## 2024-03-02 NOTE — ED NOTES
AVS discussed with patient. Pt ambulatory with steady gait to lobby. F/u instructions discussed. Return precautions discussed.

## 2024-03-02 NOTE — ED NOTES
Pt back from CT scan. Denies pain/nausea at this time but states pain in abdomen will come and go frequently. Updated on plan of care